# Patient Record
Sex: MALE | Race: WHITE | NOT HISPANIC OR LATINO | URBAN - METROPOLITAN AREA
[De-identification: names, ages, dates, MRNs, and addresses within clinical notes are randomized per-mention and may not be internally consistent; named-entity substitution may affect disease eponyms.]

---
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
## 2024-03-07 ENCOUNTER — TELEPHONE (OUTPATIENT)
Dept: PEDIATRICS | Facility: CLINIC | Age: 19
End: 2024-03-07

## 2024-03-07 NOTE — TELEPHONE ENCOUNTER
----- Message from Wenceslao Orozco MD sent at 2/23/2024 11:56 AM EST -----  Regarding: schedule [facility] Visit  Let PATIENT know that they are due for [facility].    Please schedule such as soon as possible.     If unable to reach by phone / portal, please send letter

## 2024-04-25 ENCOUNTER — TELEPHONE (OUTPATIENT)
Dept: PRIMARY CARE | Facility: CLINIC | Age: 19
End: 2024-04-25

## 2024-04-25 NOTE — TELEPHONE ENCOUNTER
----- Message from Wenceslao Orozco MD sent at 4/23/2024 10:15 AM EDT -----  Regarding: schedule [facility] Adult Visit  Let PATIENT know that they are due for [facility].    Please schedule such as soon as possible.     If unable to reach by phone / portal, please send letter

## 2024-07-02 ENCOUNTER — TELEPHONE (OUTPATIENT)
Dept: PEDIATRICS | Facility: CLINIC | Age: 19
End: 2024-07-02

## 2024-07-02 NOTE — TELEPHONE ENCOUNTER
----- Message from Wenceslao Orzoco MD sent at 7/1/2024 11:07 AM EDT -----  Regarding: schedule [facility] Adult Visit  Let PATIENT know that they are due for [facility].    Please schedule such as soon as possible.     If unable to reach by phone / portal, please send letter

## 2024-07-02 NOTE — TELEPHONE ENCOUNTER
----- Message from Wenceslao Orozco MD sent at 7/1/2024 11:07 AM EDT -----  Regarding: schedule [facility] Adult Visit  Let PATIENT know that they are due for [facility].    Please schedule such as soon as possible.     If unable to reach by phone / portal, please send letter

## 2024-09-25 ENCOUNTER — TELEPHONE (OUTPATIENT)
Dept: PEDIATRICS | Facility: CLINIC | Age: 19
End: 2024-09-25

## 2024-09-25 NOTE — TELEPHONE ENCOUNTER
----- Message from Wenceslao Orozco sent at 9/23/2024  3:01 PM EDT -----  Regarding: schedule [facility] Adult Visit  Let PATIENT know that they are due for [facility].    Please schedule such as soon as possible.     If unable to reach by phone / portal, please send letter

## 2024-10-30 ENCOUNTER — TELEPHONE (OUTPATIENT)
Dept: PEDIATRICS | Facility: CLINIC | Age: 19
End: 2024-10-30

## 2024-12-02 ENCOUNTER — TELEPHONE (OUTPATIENT)
Dept: PEDIATRICS | Facility: CLINIC | Age: 19
End: 2024-12-02

## 2024-12-02 NOTE — TELEPHONE ENCOUNTER
----- Message from Wenceslao Orozco sent at 11/11/2024  3:17 PM EST -----  Regarding: schedule [facility] Adult Visit  Let PATIENT know that they are due for [facility].    Please schedule such as soon as possible.     If unable to reach by phone / portal, please send letter

## 2024-12-16 ENCOUNTER — TELEPHONE (OUTPATIENT)
Dept: PEDIATRICS | Facility: CLINIC | Age: 19
End: 2024-12-16

## 2024-12-16 NOTE — TELEPHONE ENCOUNTER
----- Message from Wenceslao Orozco sent at 12/16/2024  2:57 PM EST -----  Regarding: schedule [facility] Adult Visit  Let PATIENT know that they are due for [facility].    Please schedule such as soon as possible.     If unable to reach by phone / portal, please send letter

## 2025-01-23 ENCOUNTER — TELEPHONE (OUTPATIENT)
Dept: PEDIATRICS | Facility: CLINIC | Age: 20
End: 2025-01-23

## 2025-01-23 NOTE — TELEPHONE ENCOUNTER
----- Message from Wenceslao Orozco sent at 1/23/2025  9:46 AM EST -----  Regarding: schedule [facility] Adult Visit  Let PATIENT know that they are due for [facility].    Please schedule such as soon as possible.     If unable to reach by phone / portal, please send letter

## 2025-02-20 ENCOUNTER — TELEPHONE (OUTPATIENT)
Dept: PRIMARY CARE | Facility: CLINIC | Age: 20
End: 2025-02-20

## 2025-02-20 NOTE — TELEPHONE ENCOUNTER
----- Message from Wenceslao Orozco sent at 2/10/2025 11:36 AM EST -----  Regarding: schedule [facility] Adult Visit  Let PATIENT know that they are due for [facility].    Please schedule such as soon as possible.     If unable to reach by phone / portal, please send letter

## 2025-03-17 ENCOUNTER — TELEPHONE (OUTPATIENT)
Dept: PEDIATRICS | Facility: CLINIC | Age: 20
End: 2025-03-17

## 2025-03-17 NOTE — TELEPHONE ENCOUNTER
----- Message from Wenceslao Orozco sent at 3/3/2025  9:41 AM EST -----  Regarding: schedule [facility] Adult Visit  Let PATIENT know that they are due for [facility].    Based on our records, patient is quite  behind on their medical care and/or on immunizations.  If patient is followed by another provider, let us know and we will update our records.    Otherwise, please schedule a Well Visit as soon as possible.     If unable to reach by phone / portal, please send letter

## 2025-05-09 ENCOUNTER — TELEPHONE (OUTPATIENT)
Dept: PEDIATRICS | Facility: CLINIC | Age: 20
End: 2025-05-09

## 2025-05-09 NOTE — TELEPHONE ENCOUNTER
----- Message from Wenceslao Orozco sent at 5/9/2025 10:05 AM EDT -----  Regarding: schedule [facility] Adult Visit  Let PATIENT know that they are due for [facility].  Based on our records, patient is quite  behind on their medical care and/or on immunizations.If patient is followed by another provider, let us know and we will update our records.  Otherwise, please schedule a Well Visit as soon as possible. If unable to reach by phone / portal, please send letter

## 2025-07-31 ENCOUNTER — TELEPHONE (OUTPATIENT)
Dept: PEDIATRICS | Facility: CLINIC | Age: 20
End: 2025-07-31

## 2025-07-31 NOTE — TELEPHONE ENCOUNTER
----- Message from Wenceslao Orozco sent at 7/30/2025 10:48 AM EDT -----  Regarding: schedule [facility] Adult Visit  Let PATIENT know that they are due for [facility].    Based on our records, patient is quite  behind on their medical care and/or on immunizations.  If patient is followed by another provider, let us know and we will update our records.    Otherwise, please schedule a Well Visit as soon as possible.     If unable to reach by phone / portal, please send letter

## 2025-08-28 ENCOUNTER — TELEPHONE (OUTPATIENT)
Dept: PEDIATRICS | Facility: CLINIC | Age: 20
End: 2025-08-28